# Patient Record
Sex: MALE | Race: WHITE | NOT HISPANIC OR LATINO | URBAN - METROPOLITAN AREA
[De-identification: names, ages, dates, MRNs, and addresses within clinical notes are randomized per-mention and may not be internally consistent; named-entity substitution may affect disease eponyms.]

---

## 2017-02-09 ENCOUNTER — COMMUNICATION - HEALTHEAST (OUTPATIENT)
Dept: FAMILY MEDICINE | Facility: CLINIC | Age: 37
End: 2017-02-09

## 2017-02-09 ENCOUNTER — OFFICE VISIT - HEALTHEAST (OUTPATIENT)
Dept: FAMILY MEDICINE | Facility: CLINIC | Age: 37
End: 2017-02-09

## 2017-02-09 DIAGNOSIS — F32.A ANXIETY AND DEPRESSION: ICD-10-CM

## 2017-02-09 DIAGNOSIS — M79.643 HAND PAIN: ICD-10-CM

## 2017-02-09 DIAGNOSIS — F41.9 ANXIETY AND DEPRESSION: ICD-10-CM

## 2017-02-09 DIAGNOSIS — K21.9 GERD (GASTROESOPHAGEAL REFLUX DISEASE): ICD-10-CM

## 2017-02-09 DIAGNOSIS — R61 NIGHT SWEATS: ICD-10-CM

## 2017-02-09 LAB — HBA1C MFR BLD: 9.4 % (ref 3.5–6)

## 2017-02-09 ASSESSMENT — MIFFLIN-ST. JEOR: SCORE: 1517.48

## 2017-02-10 LAB — ANA SER QL: 0.3 U

## 2017-02-22 ENCOUNTER — OFFICE VISIT - HEALTHEAST (OUTPATIENT)
Dept: EDUCATION SERVICES | Facility: CLINIC | Age: 37
End: 2017-02-22

## 2017-04-18 ENCOUNTER — COMMUNICATION - HEALTHEAST (OUTPATIENT)
Dept: FAMILY MEDICINE | Facility: CLINIC | Age: 37
End: 2017-04-18

## 2017-04-18 ENCOUNTER — RECORDS - HEALTHEAST (OUTPATIENT)
Dept: ADMINISTRATIVE | Facility: OTHER | Age: 37
End: 2017-04-18

## 2017-04-19 ENCOUNTER — COMMUNICATION - HEALTHEAST (OUTPATIENT)
Dept: FAMILY MEDICINE | Facility: CLINIC | Age: 37
End: 2017-04-19

## 2017-04-19 ENCOUNTER — OFFICE VISIT - HEALTHEAST (OUTPATIENT)
Dept: FAMILY MEDICINE | Facility: CLINIC | Age: 37
End: 2017-04-19

## 2017-04-19 DIAGNOSIS — F32.A ANXIETY AND DEPRESSION: ICD-10-CM

## 2017-04-19 DIAGNOSIS — F41.9 ANXIETY AND DEPRESSION: ICD-10-CM

## 2017-04-19 LAB — HBA1C MFR BLD: 9.2 % (ref 3.5–6)

## 2017-04-20 ENCOUNTER — RECORDS - HEALTHEAST (OUTPATIENT)
Dept: ADMINISTRATIVE | Facility: OTHER | Age: 37
End: 2017-04-20

## 2017-04-21 ENCOUNTER — RECORDS - HEALTHEAST (OUTPATIENT)
Dept: ADMINISTRATIVE | Facility: OTHER | Age: 37
End: 2017-04-21

## 2017-04-27 ENCOUNTER — OFFICE VISIT - HEALTHEAST (OUTPATIENT)
Dept: FAMILY MEDICINE | Facility: CLINIC | Age: 37
End: 2017-04-27

## 2017-04-27 DIAGNOSIS — F32.A ANXIETY AND DEPRESSION: ICD-10-CM

## 2017-04-27 DIAGNOSIS — K21.9 GASTROESOPHAGEAL REFLUX DISEASE WITHOUT ESOPHAGITIS: ICD-10-CM

## 2017-04-27 DIAGNOSIS — F41.9 ANXIETY AND DEPRESSION: ICD-10-CM

## 2017-05-31 ENCOUNTER — OFFICE VISIT - HEALTHEAST (OUTPATIENT)
Dept: FAMILY MEDICINE | Facility: CLINIC | Age: 37
End: 2017-05-31

## 2017-05-31 ENCOUNTER — COMMUNICATION - HEALTHEAST (OUTPATIENT)
Dept: SCHEDULING | Facility: CLINIC | Age: 37
End: 2017-05-31

## 2017-05-31 DIAGNOSIS — R61 NIGHT SWEATS: ICD-10-CM

## 2017-05-31 DIAGNOSIS — R10.9 ABDOMINAL PAIN: ICD-10-CM

## 2017-06-01 ENCOUNTER — COMMUNICATION - HEALTHEAST (OUTPATIENT)
Dept: FAMILY MEDICINE | Facility: CLINIC | Age: 37
End: 2017-06-01

## 2017-06-01 DIAGNOSIS — F41.9 ANXIETY AND DEPRESSION: ICD-10-CM

## 2017-06-01 DIAGNOSIS — F32.A ANXIETY AND DEPRESSION: ICD-10-CM

## 2017-06-01 LAB
BASOPHILS # BLD AUTO: 0.1 THOU/UL (ref 0–0.2)
BASOPHILS NFR BLD AUTO: 1 % (ref 0–2)
EOSINOPHIL # BLD AUTO: 0.1 THOU/UL (ref 0–0.4)
EOSINOPHIL NFR BLD AUTO: 1 % (ref 0–6)
ERYTHROCYTE [DISTWIDTH] IN BLOOD BY AUTOMATED COUNT: 13.6 % (ref 11–14.5)
HCT VFR BLD AUTO: 43.6 % (ref 40–54)
HGB BLD-MCNC: 14.6 G/DL (ref 14–18)
LAB AP CHARGES (HE HISTORICAL CONVERSION): NORMAL
LYMPHOCYTES # BLD AUTO: 2.7 THOU/UL (ref 0.8–4.4)
LYMPHOCYTES NFR BLD AUTO: 34 % (ref 20–40)
MCH RBC QN AUTO: 31.1 PG (ref 27–34)
MCHC RBC AUTO-ENTMCNC: 33.5 G/DL (ref 32–36)
MCV RBC AUTO: 93 FL (ref 80–100)
MONOCYTES # BLD AUTO: 0.4 THOU/UL (ref 0–0.9)
MONOCYTES NFR BLD AUTO: 5 % (ref 2–10)
NEUTROPHILS # BLD AUTO: 4.7 THOU/UL (ref 2–7.7)
NEUTROPHILS NFR BLD AUTO: 59 % (ref 50–70)
PATH REPORT.COMMENTS IMP SPEC: NORMAL
PATH REPORT.COMMENTS IMP SPEC: NORMAL
PATH REPORT.FINAL DX SPEC: NORMAL
PATH REPORT.MICROSCOPIC SPEC OTHER STN: ABNORMAL
PATH REPORT.MICROSCOPIC SPEC OTHER STN: NORMAL
PATH REPORT.RELEVANT HX SPEC: NORMAL
PLATELET # BLD AUTO: 198 THOU/UL (ref 140–440)
PMV BLD AUTO: 12.2 FL (ref 8.5–12.5)
RBC # BLD AUTO: 4.7 MILL/UL (ref 4.4–6.2)
WBC: 8 THOU/UL (ref 4–11)

## 2017-06-02 ENCOUNTER — HOSPITAL ENCOUNTER (OUTPATIENT)
Dept: CT IMAGING | Facility: HOSPITAL | Age: 37
Discharge: HOME | End: 2017-06-02
Attending: NURSE PRACTITIONER

## 2017-06-02 ENCOUNTER — COMMUNICATION - HEALTHEAST (OUTPATIENT)
Dept: TELEHEALTH | Facility: CLINIC | Age: 37
End: 2017-06-02

## 2017-06-02 ENCOUNTER — COMMUNICATION - HEALTHEAST (OUTPATIENT)
Dept: FAMILY MEDICINE | Facility: CLINIC | Age: 37
End: 2017-06-02

## 2017-06-02 DIAGNOSIS — R10.9 ABDOMINAL PAIN: ICD-10-CM

## 2017-06-02 DIAGNOSIS — Z76.89 SLEEP CONCERN: ICD-10-CM

## 2017-06-05 ENCOUNTER — RECORDS - HEALTHEAST (OUTPATIENT)
Dept: ADMINISTRATIVE | Facility: OTHER | Age: 37
End: 2017-06-05

## 2017-06-19 ENCOUNTER — COMMUNICATION - HEALTHEAST (OUTPATIENT)
Dept: FAMILY MEDICINE | Facility: CLINIC | Age: 37
End: 2017-06-19

## 2017-06-19 DIAGNOSIS — F32.A ANXIETY AND DEPRESSION: ICD-10-CM

## 2017-06-19 DIAGNOSIS — F41.9 ANXIETY AND DEPRESSION: ICD-10-CM

## 2017-06-29 ENCOUNTER — OFFICE VISIT - HEALTHEAST (OUTPATIENT)
Dept: FAMILY MEDICINE | Facility: CLINIC | Age: 37
End: 2017-06-29

## 2017-06-29 DIAGNOSIS — K21.9 GASTROESOPHAGEAL REFLUX DISEASE WITHOUT ESOPHAGITIS: ICD-10-CM

## 2017-06-29 DIAGNOSIS — Z01.810 PREOPERATIVE CARDIOVASCULAR EXAMINATION: ICD-10-CM

## 2017-06-29 ASSESSMENT — MIFFLIN-ST. JEOR: SCORE: 1530.24

## 2017-07-04 ENCOUNTER — RECORDS - HEALTHEAST (OUTPATIENT)
Dept: ADMINISTRATIVE | Facility: OTHER | Age: 37
End: 2017-07-04

## 2017-07-06 ENCOUNTER — COMMUNICATION - HEALTHEAST (OUTPATIENT)
Dept: FAMILY MEDICINE | Facility: CLINIC | Age: 37
End: 2017-07-06

## 2017-07-10 ENCOUNTER — RECORDS - HEALTHEAST (OUTPATIENT)
Dept: ADMINISTRATIVE | Facility: OTHER | Age: 37
End: 2017-07-10

## 2017-07-13 ENCOUNTER — AMBULATORY - HEALTHEAST (OUTPATIENT)
Dept: ENDOCRINOLOGY | Facility: CLINIC | Age: 37
End: 2017-07-13

## 2017-07-13 ENCOUNTER — OFFICE VISIT - HEALTHEAST (OUTPATIENT)
Dept: ENDOCRINOLOGY | Facility: CLINIC | Age: 37
End: 2017-07-13

## 2017-07-13 ASSESSMENT — MIFFLIN-ST. JEOR: SCORE: 1485.5

## 2017-07-19 ENCOUNTER — COMMUNICATION - HEALTHEAST (OUTPATIENT)
Dept: FAMILY MEDICINE | Facility: CLINIC | Age: 37
End: 2017-07-19

## 2017-07-19 DIAGNOSIS — F32.A ANXIETY AND DEPRESSION: ICD-10-CM

## 2017-07-19 DIAGNOSIS — F41.9 ANXIETY AND DEPRESSION: ICD-10-CM

## 2017-07-20 ENCOUNTER — COMMUNICATION - HEALTHEAST (OUTPATIENT)
Dept: ENDOCRINOLOGY | Facility: CLINIC | Age: 37
End: 2017-07-20

## 2017-07-20 ENCOUNTER — COMMUNICATION - HEALTHEAST (OUTPATIENT)
Dept: FAMILY MEDICINE | Facility: CLINIC | Age: 37
End: 2017-07-20

## 2017-07-21 RX ORDER — LORAZEPAM 0.5 MG/1
TABLET ORAL
Qty: 30 TABLET | Refills: 0 | Status: SHIPPED | OUTPATIENT
Start: 2017-07-21

## 2017-07-25 ENCOUNTER — COMMUNICATION - HEALTHEAST (OUTPATIENT)
Dept: ENDOCRINOLOGY | Facility: CLINIC | Age: 37
End: 2017-07-25

## 2017-07-28 ENCOUNTER — COMMUNICATION - HEALTHEAST (OUTPATIENT)
Dept: FAMILY MEDICINE | Facility: CLINIC | Age: 37
End: 2017-07-28

## 2017-07-28 DIAGNOSIS — F32.A ANXIETY AND DEPRESSION: ICD-10-CM

## 2017-07-28 DIAGNOSIS — F41.9 ANXIETY AND DEPRESSION: ICD-10-CM

## 2017-08-03 ENCOUNTER — COMMUNICATION - HEALTHEAST (OUTPATIENT)
Dept: FAMILY MEDICINE | Facility: CLINIC | Age: 37
End: 2017-08-03

## 2017-08-03 DIAGNOSIS — F41.9 ANXIETY AND DEPRESSION: ICD-10-CM

## 2017-08-03 DIAGNOSIS — F32.A ANXIETY AND DEPRESSION: ICD-10-CM

## 2017-08-24 ENCOUNTER — COMMUNICATION - HEALTHEAST (OUTPATIENT)
Dept: FAMILY MEDICINE | Facility: CLINIC | Age: 37
End: 2017-08-24

## 2017-08-26 ENCOUNTER — RECORDS - HEALTHEAST (OUTPATIENT)
Dept: ADMINISTRATIVE | Facility: OTHER | Age: 37
End: 2017-08-26

## 2017-09-06 ENCOUNTER — COMMUNICATION - HEALTHEAST (OUTPATIENT)
Dept: FAMILY MEDICINE | Facility: CLINIC | Age: 37
End: 2017-09-06

## 2017-09-06 DIAGNOSIS — K21.9 GERD (GASTROESOPHAGEAL REFLUX DISEASE): ICD-10-CM

## 2017-09-07 ENCOUNTER — COMMUNICATION - HEALTHEAST (OUTPATIENT)
Dept: FAMILY MEDICINE | Facility: CLINIC | Age: 37
End: 2017-09-07

## 2017-09-07 RX ORDER — LISINOPRIL 2.5 MG/1
TABLET ORAL
Qty: 90 TABLET | Refills: 2 | Status: SHIPPED | OUTPATIENT
Start: 2017-09-07

## 2017-09-11 ENCOUNTER — COMMUNICATION - HEALTHEAST (OUTPATIENT)
Dept: FAMILY MEDICINE | Facility: CLINIC | Age: 37
End: 2017-09-11

## 2017-10-10 ENCOUNTER — COMMUNICATION - HEALTHEAST (OUTPATIENT)
Dept: FAMILY MEDICINE | Facility: CLINIC | Age: 37
End: 2017-10-10

## 2017-10-16 ENCOUNTER — COMMUNICATION - HEALTHEAST (OUTPATIENT)
Dept: FAMILY MEDICINE | Facility: CLINIC | Age: 37
End: 2017-10-16

## 2017-11-08 ENCOUNTER — COMMUNICATION - HEALTHEAST (OUTPATIENT)
Dept: FAMILY MEDICINE | Facility: CLINIC | Age: 37
End: 2017-11-08

## 2017-12-25 ENCOUNTER — COMMUNICATION - HEALTHEAST (OUTPATIENT)
Dept: FAMILY MEDICINE | Facility: CLINIC | Age: 37
End: 2017-12-25

## 2018-01-19 ENCOUNTER — COMMUNICATION - HEALTHEAST (OUTPATIENT)
Dept: ENDOCRINOLOGY | Facility: CLINIC | Age: 38
End: 2018-01-19

## 2018-01-19 RX ORDER — GLUCOSAMINE HCL/CHONDROITIN SU 500-400 MG
1 CAPSULE ORAL PRN
Qty: 100 STRIP | Refills: 0 | Status: SHIPPED | OUTPATIENT
Start: 2018-01-19

## 2018-05-15 ENCOUNTER — COMMUNICATION - HEALTHEAST (OUTPATIENT)
Dept: FAMILY MEDICINE | Facility: CLINIC | Age: 38
End: 2018-05-15

## 2018-06-22 ENCOUNTER — COMMUNICATION - HEALTHEAST (OUTPATIENT)
Dept: FAMILY MEDICINE | Facility: CLINIC | Age: 38
End: 2018-06-22

## 2018-08-06 ENCOUNTER — RECORDS - HEALTHEAST (OUTPATIENT)
Dept: ADMINISTRATIVE | Facility: OTHER | Age: 38
End: 2018-08-06

## 2019-02-27 ENCOUNTER — COMMUNICATION - HEALTHEAST (OUTPATIENT)
Dept: FAMILY MEDICINE | Facility: CLINIC | Age: 39
End: 2019-02-27

## 2019-04-08 ENCOUNTER — COMMUNICATION - HEALTHEAST (OUTPATIENT)
Dept: ENDOCRINOLOGY | Facility: CLINIC | Age: 39
End: 2019-04-08

## 2019-04-17 ENCOUNTER — COMMUNICATION - HEALTHEAST (OUTPATIENT)
Dept: FAMILY MEDICINE | Facility: CLINIC | Age: 39
End: 2019-04-17

## 2019-04-24 ENCOUNTER — COMMUNICATION - HEALTHEAST (OUTPATIENT)
Dept: ENDOCRINOLOGY | Facility: CLINIC | Age: 39
End: 2019-04-24

## 2021-04-06 ENCOUNTER — AMBULATORY - HEALTHEAST (OUTPATIENT)
Dept: NURSING | Facility: CLINIC | Age: 41
End: 2021-04-06

## 2021-04-27 ENCOUNTER — AMBULATORY - HEALTHEAST (OUTPATIENT)
Dept: NURSING | Facility: CLINIC | Age: 41
End: 2021-04-27

## 2021-05-27 ENCOUNTER — RECORDS - HEALTHEAST (OUTPATIENT)
Dept: ADMINISTRATIVE | Facility: CLINIC | Age: 41
End: 2021-05-27

## 2021-05-30 VITALS — WEIGHT: 153.19 LBS | BODY MASS INDEX: 25.49 KG/M2

## 2021-05-30 VITALS — WEIGHT: 152.25 LBS | HEIGHT: 65 IN | BODY MASS INDEX: 25.37 KG/M2

## 2021-05-30 VITALS — WEIGHT: 146.25 LBS | BODY MASS INDEX: 24.34 KG/M2

## 2021-05-31 ENCOUNTER — RECORDS - HEALTHEAST (OUTPATIENT)
Dept: ADMINISTRATIVE | Facility: CLINIC | Age: 41
End: 2021-05-31

## 2021-05-31 VITALS — BODY MASS INDEX: 25.83 KG/M2 | WEIGHT: 155.06 LBS | HEIGHT: 65 IN

## 2021-05-31 VITALS — HEIGHT: 65 IN | BODY MASS INDEX: 24.19 KG/M2 | WEIGHT: 145.2 LBS

## 2021-05-31 VITALS — WEIGHT: 157.13 LBS | BODY MASS INDEX: 26.15 KG/M2

## 2021-06-08 NOTE — PROGRESS NOTES
Assessment & Plan:  1. Uncontrolled type 1 diabetes mellitus with complication  A1C is 9.5 today, we will continue to monitor closely every 3 months and get patient referred to endocrine as well as having him eat more urgently with diabetic educator.  - LANTUS SOLOSTAR 100 unit/mL (3 mL) pen; Take 25 Units by mouth bedtime.  Dispense: 5 adj dose pen; Refill: 1  - lisinopril (PRINIVIL,ZESTRIL) 2.5 MG tablet; Take 1 tablet (2.5 mg total) by mouth daily.  Dispense: 90 tablet; Refill: 1  - NOVOLOG FLEXPEN 100 unit/mL injection pen; Inject 5 Units under the skin as needed.  Dispense: 5 Pre-filled Pen Syringe; Refill: 2  - Ambulatory referral to Diabetes Education (Existing Diagnosis)  - Glycosylated Hemoglobin A1c  - Ambulatory referral to Endocrinology  - Comprehensive Metabolic Panel  - Microalbumin, Random Urine    2. Hand pain  Regarding bilateral hand pain we will check labs to rule out rheumatoid arthritis.  Other differentials include but are not limited to osteoarthritis, carpal tunnel, neuropathy.  If labs are normal referral for patient to orthopedics for further evaluation and management.  - Ambulatory referral to Orthopedics  - Rheumatoid Factor Quant  - Antinuclear Antibody (DARRIAN) Cascade  - C-Reactive Protein(CRP)    3. GERD (gastroesophageal reflux disease)  Seems fairly well-controlled on his current regimen.  Continue for now.  Patient does note that since his cholecystectomy he has noticed increase in nausea fairly frequently during the week.  Will discuss this in more detail and he follows up for his med check for sertraline in 1 month.  - omeprazole (PRILOSEC) 20 MG capsule; Take 1 capsule (20 mg total) by mouth daily.  Dispense: 90 capsule; Refill: 1  - ranitidine (ZANTAC) 150 MG tablet; Take 2 tablets (300 mg total) by mouth bedtime.  Dispense: 90 tablet; Refill: 1    4. Anxiety and depression  Patient desires to switch medications, will switch to sertraline as he has previously been well controlled  on Paxil for mostly situational and social anxiety.  Discussed that it is appropriate to switch directly from current dose to new dose of sertraline preop.  Discussed side effects in detail.  We will start at 50 mg daily and have him come back for medication check in 1 month.  Let us know if side effects are bothersome prior to visit.  - sertraline (ZOLOFT) 50 MG tablet; Take 1 tablet (50 mg total) by mouth daily.  Dispense: 30 tablet; Refill: 2    5. Night sweats  Check lab work today, suspect that sweats may have to do with former drug use or possibly lack of control for diabetes.  As long as labs look okay we can continue to monitor symptoms.  She has had a recent TB test approximately 1 year ago and no previous history of IV drug use or risk for HIV.  - HM1(CBC and Differential)  - Thyroid [place]      There are no Patient Instructions on file for this visit.    Orders Placed This Encounter   Procedures     Glycosylated Hemoglobin A1c     Comprehensive Metabolic Panel     Thyroid Cascade     HM1 (CBC with Diff)     Microalbumin, Random Urine     Rheumatoid Factor Quant     Antinuclear Antibody (DARRIAN) Cascade     C-Reactive Protein(CRP)     Ambulatory referral to Orthopedics     Referral Priority:   Routine     Referral Type:   Consultation     Referral Reason:   Evaluation and Treatment     Requested Specialty:   Orthopedics     Number of Visits Requested:   1     Ambulatory referral to Diabetes Education (Existing Diagnosis)     Referral Priority:   Routine     Referral Type:   Consultation     Referral Reason:   Evaluation and Treatment     Number of Visits Requested:   1     Ambulatory referral to Endocrinology     Referral Priority:   Routine     Referral Type:   Consultation     Referral Reason:   Evaluation and Treatment     Requested Specialty:   Endocrinology     Number of Visits Requested:   1     Medications Discontinued During This Encounter   Medication Reason     LANTUS SOLOSTAR 100 unit/mL (3 mL)  pen Reorder     lisinopril (PRINIVIL,ZESTRIL) 2.5 MG tablet Reorder     NOVOLOG FLEXPEN 100 unit/mL injection pen Reorder     omeprazole (PRILOSEC) 20 MG capsule Reorder     ranitidine (ZANTAC) 150 MG tablet Reorder         I have counseled the patient for tobacco cessation and the follow up will occur  at the next visit.    Chief Complaint:   Chief Complaint   Patient presents with     Medication Management     Medication Refill     Establish Care       History of Present Illness:  Boris is a 36 y.o. male presenting to the clinic today to establish care and discuss several concerns.  Patient is a type I diabetic who is on insulin daily for control.  He has not been followed up on in about a year for his A1c or diabetic medications.  He is here requesting lab work and A1c today.  He would also like to establish care with a new endocrinologist. He believes his last A1c was in the high sevens.  Today it came back at 9.4.  He is also not been in to see a diabetic educator in several years, we will place that referral for him as well today.  Related to diabetes he believes he may have some neuropathy in the hands as he has been having problems with his hands for several years.  He notes that he has pain in both thumbs and they lock up at times on a regular basis over the last couple of years.  Overall hand pain has been present for 4 years.  Intermittently he will have swelling in the joints of the hands on and off when he uses them a lot at work.  He also get numbness and tingling and burning pain in the left forearm.  He is not sure of any family history of rheumatoid arthritis.  He currently smokes half a pack per day, he does not use alcohol as he quit several years ago.  He does have a past history of illicit drug use including methamphetamines and marijuana.  He is sober from meth for 1 year.  He does also complain of night sweats for approximately 5 years on and off.  He is not sure what the cause of these are but  "they do wake him up at night.  He does not feel it is related to blood sugar because even on he has been low he has never had that severe of the symptom.  Does not feel palpitations or chest pain.  No recent illnesses.  He also takes Paxil currently for anxiety and depression.  He has been stable on this dose of medication for many years.  He does feel he has been getting more GI side effects with that in the past year or so.  He notes this occurred after his gallbladder removal 2 years ago.  He is interested in trying a different medication, as he does not feel that Paxil is controlling his mood very well and he has noticed increasing and side effects.    All other systems are negative except as noted above.    [facility]:  reviewed and updated.    Tobacco Use:  History   Smoking Status     Current Every Day Smoker   Smokeless Tobacco     Not on file       Vitals:  Vitals:    02/09/17 0807   BP: 114/80   Patient Site: Left Arm   Patient Position: Sitting   Cuff Size: Adult Regular   Pulse: 78   Resp: 16   Temp: 97.9  F (36.6  C)   TempSrc: Oral   Weight: 152 lb 4 oz (69.1 kg)   Height: 5' 5\" (1.651 m)     Wt Readings from Last 3 Encounters:   02/09/17 152 lb 4 oz (69.1 kg)   09/19/16 155 lb (70.3 kg)   09/03/16 155 lb (70.3 kg)       Physical Exam:  Constitutional:  Reveals an alert, cooperative, 35 yo male in no acute distress.  Vitals:  Per nursing notes.  Eyes: PERRLA, Fundoscopic exam WNL  Ears:  TMs clear bilaterally  Oropharynx:  Without erythema, posterior nasal drainage or thrush.  Neck:  Supple, no lymphadenopathy,  thyroid not palpable.  Cardiac:  Regular rate and rhythm without murmurs, rubs, or gallops. Carotids without bruits. Legs without edema.   Lungs: Clear.  Respiratory effort normal.  Abdomen:  non-tender, non-distended.  Neither liver nor spleen palpable.  Skin:   Without rash, bruise, or palpable lesions.  Extremities: CMS intact, distal pulses intact. No inguinal or axillary " lymphadenopathy.  Rheumatologic: Normal joints and nails of the hands.  Neurologic:  No gross focal deficits.    Psychiatric:  Mood appropriate, memory intact.     Data Reviewed:  Additional History from Old Records or Another Person Summarized (2 total): None.     Decision to Obtain Extra information (1 total): None.     Radiology Tests Summarized and Ordered (XRAY/CT/MRI/DXA) (1 total): None.    Labs Reviewed and Ordered (1 total): CMP, heme 1, A1c, DARRIAN, rheumatoid factor, CRP, thyroid cascade    Medicine Tests Summarized and Ordered (EKG/ECHO/COLONOSCOPY/EGD) (1 total): None.    Independent Review of EKG or X-Ray (2 each): None.    The visit lasted a total of 45 minutes face to face with the patient. Over 50% of the time was spent counseling and educating the patient about plan of care.    Medications:  Current Outpatient Prescriptions   Medication Sig Dispense Refill     INSULIN ASPART (NOVOLOG SUBQ) Inject under the skin.       INSULIN GLARGINE,HUM.REC.ANLOG (LANTUS SUBQ) Inject under the skin.       LANTUS SOLOSTAR 100 unit/mL (3 mL) pen Take 25 Units by mouth bedtime. 5 adj dose pen 1     lisinopril (PRINIVIL,ZESTRIL) 2.5 MG tablet Take 1 tablet (2.5 mg total) by mouth daily. 90 tablet 1     NOVOLOG FLEXPEN 100 unit/mL injection pen Inject 5 Units under the skin as needed. 5 Pre-filled Pen Syringe 2     omeprazole (PRILOSEC) 20 MG capsule Take 1 capsule (20 mg total) by mouth daily. 90 capsule 1     OMEPRAZOLE ORAL Take by mouth.       PARoxetine (PAXIL) 20 MG tablet Take 20 mg by mouth daily.  0     PAROXETINE HCL (PAXIL ORAL) Take by mouth.       ranitidine (ZANTAC) 150 MG tablet Take 2 tablets (300 mg total) by mouth bedtime. 90 tablet 1     RANITIDINE HCL ORAL Take by mouth.       sertraline (ZOLOFT) 50 MG tablet Take 1 tablet (50 mg total) by mouth daily. 30 tablet 2     No current facility-administered medications for this visit.        Total Data Points: 1    YASHIRA Abreu, CNP    This note has been  dictated using voice recognition software. Any grammatical or context distortions are unintentional and inherent to the software

## 2021-06-09 NOTE — PROGRESS NOTES
"Assessment: pt seen today for DM education. He was diagnosed at 10 years old. He admits he was not really even checking his BG for a long time. He was given a new meter at his visit with Marilee Griffin CNP on 2/9 and has used it a couple times. He did not bring meter in today. He recalls last night his BG was 97. 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 He is very active at work. On the weekends he tries to stay active as well, he likes to play [employer] golf.     Pt typically does not eat breakfast, he will have a coffee with sugar or diet soda in the morning. Lunch may be a sandwich and chips and dinner may be a steak or burger. He does not snack much, but he does like sweets. Pt states \"I just eat whatever, and whatever I want.\" We reviewed CHO foods today and portions. Discussed the importance of trying to eat breakfast. Pt feels like he wants better control over his DM. He seems motivated.     Plan: start checking BG at least 3-4x/day, rotating before and 2 hours after meals. Log in log book. Start paying closer attention to CHO portions, goal 3-4/meal. Eat something for breakfast.   Once we have BG data we can start adjusting insulin to have better control of your numbers. If we do not get good data in 2 weeks at our f/u. We can consider doing a 5 day ipro CGM. Pt is willing to do this.     Subjective and Objective:      Boris Pierson is referred by Marilee Griffin CNP for Diabetes Education.     Lab Results   Component Value Date    HGBA1C 9.4 (H) 02/09/2017         Current diabetes medications:  Lantus 25 units/day. Novolog 5 units/meals. May take 6 units if it is a big meal.       Follow up:   2 weeks, 3/8 as scheduled       Education:     Monitoring   Meter (per above goals): Discussed  Monitoring: Assessed and Discussed  BG goals: Discussed    Nutrition Management  Nutrition Management: " Assessed, Discussed and Literature provided  Weight: Not addressed  Portions/Balance: Assessed, Discussed and Literature provided  Carb ID/Count: Assessed, Discussed and Literature provided  Label Reading: Not addressed  Heart Healthy Fats: Literature provided  Menu Planning: Assessed and Discussed  Dining Out: Assessed and Discussed  Physical Activity: Assessed and Discussed  Injected Medications: Discussed   Storage/Exp:Not addressed   Site Rotation: Not addressed   Sites Assessed: no    Diabetes Disease Process: Discussed    Acute Complications: Prevent, Detect, Treat:  Hypoglycemia: Assessed and Discussed  Hyperglycemia: Assessed and Discussed  Sick Days: Not addressed  Driving: Not addressed    Chronic Complications  Foot Care:Discussed  Skin Care: Not addressed  Eye: Discussed and he is due for eye exam  ABC: Discussed  Teeth:Not addressed  Goal Setting and Problem Solving: Assessed and Discussed  Barriers: Assessed and Discussed  Psychosocial Adjustments: Assessed and Discussed      Time spent with the patient: 30 minutes for diabetes education and counseling.   Previous Education: no  Visit Type:DSMT  Hours Remaining: DSMT 1.5 and MNT 2      Pari Oviedo  2/22/2017

## 2021-06-10 NOTE — PROGRESS NOTES
Assessment & Plan:  1. Anxiety and depression  Boris started on Paxil during his hospitalization.  It is only been about a week and so he has not found it to be helpful yet.  They did also give him as needed lorazepam for panic attack or anxiety.  Thus far he is not use this as he knows he should use it sparingly. He would like to increase the dose of Paxil as he has tolerated it well so far. He will increase to 30 mg daily for at least one week, and we discussed then moving to 40 mg daily if he desires. Continue current dose of medication and follow-up with me in 1 month for medication check.  At that time we can make further dose adjustments as necessary if anxiety and depression is not under better control.  - LORazepam (ATIVAN) 0.5 MG tablet; TAKE 1 TABLET PO TID PRN FOR ANXIETY.; Refill: 0    2. Uncontrolled type 1 diabetes mellitus with complication  Encouraged him to set up appointment with Endocrinology. He plans to call today.     3. Gastroesophageal reflux disease without esophagitis  GERD symptoms a bit worse since starting Paxil. Discussed taking with food, and that most GI side effects if related to medication should improve over the next few weeks.       There are no Patient Instructions on file for this visit.    No orders of the defined types were placed in this encounter.    There are no discontinued medications.        I have counseled the patient for tobacco cessation and the follow up will occur  at the next visit.  The following are part of a depression follow up plan for the patient: management of mental health treatment and patient follow-up to return when and if necessary     Chief Complaint:   Chief Complaint   Patient presents with     Follow-up     [facility] 4/20-4/21        History of Present Illness:  Boris is a 36 y.o. male presenting to the clinic today for follow up after ED visit and overnight admission to [facility] for Anxiety.On the evening of 4/20/2017 he began  experiencing some anxiety and pain in his chest and tingling sensation down both of his legs.  At that point he presented to the emergency room for evaluation.  Due to the chest pain they sent him to the emergency department at [facility].  Once there he was given lorazepam and evaluated for cardiac concerns.  He was admitted overnight for observation and they also performed a stress test which was normal.  He does have a history of methamphetamine abuse and states that he did smoke methamphetamine 5 days prior to this episode occurring.  He is also been having increased anxiety prior to what he considers a panic attack that occurred leading him to this admission.  After observation and monitoring overnight in the hospital he was discharged home he was started on Paxil 20 mg daily and given a prescription for lorazepam 0.5 mg as needed for anxiety.    He has also noticed since starting Paxil that his GERD is a bit worse. Still using PPI daily. He has not noticed much effect yet from the Paxil, but only started it 6 days ago. Diabetes has been the same as far as blood sugars. He plans to call endocrine for an appointment today.      Review of Systems:  All other systems are negative except as noted above.    [facility]:  Reviewed and updated. Sober from Meth for 2 weeks. Plans to continue with sobriety. He only used meth occasionally in the past. He does use regular marijuana. He is not interested in chemical dependency treatment. He states he has gotten in touch with his sober friends and has people he can call if he has cravings.    Tobacco Use:  History   Smoking Status     Current Every Day Smoker   Smokeless Tobacco     Not on file       Vitals:  Vitals:    04/27/17 0805   BP: 132/76   Patient Site: Left Arm   Patient Position: Sitting   Cuff Size: Adult Regular   Pulse: 74   Resp: 16   Weight: 153 lb 3 oz (69.5 kg)     Wt Readings from Last 3 Encounters:   04/27/17 153 lb 3 oz (69.5 kg)   04/19/17 146 lb 4 oz  (66.3 kg)   02/09/17 152 lb 4 oz (69.1 kg)       Physical Exam:  Constitutional:  Reveals an alert, cooperative, 36 year old male in no acute distress.  Vitals:  Per nursing notes.  Eyes: PERRLA, funduscopic exam within normal limits.  HENT: TMs clear bilaterally,Oropharynx without erythema, posterior nasal drainage or thrush. Neck supple, no lymphadenopathy,  thyroid not palpable.  Cardiovascular:  Regular rate and rhythm without murmurs, rubs, or gallops. Carotids without bruits. Legs without edema.   Respiratory: Clear.  Respiratory effort normal.  Psychiatric:  Mood appropriate, memory intact.     Data Reviewed:  Additional History from Old Records or Another Person Summarized (2 total): None.     Decision to Obtain Extra information (1 total): None.     Radiology Tests Summarized and Ordered (XRAY/CT/MRI/DXA) (1 total): None.    Labs Reviewed and Ordered (1 total): None.    Medicine Tests Summarized and Ordered (EKG/ECHO/COLONOSCOPY/EGD) (1 total): None.    Independent Review of EKG or X-Ray (2 each): None.    The visit lasted a total of 25 minutes face to face with the patient. Over 50% of the time was spent counseling and educating the patient about plan of care.    Medications:  Current Outpatient Prescriptions   Medication Sig Dispense Refill     INSULIN ASPART (NOVOLOG SUBQ) Inject under the skin.       insulin glargine (LANTUS; BASAGLAR) 100 unit/mL (3 mL) pen Inject 30 Units under the skin at bedtime. 5 adj dose pen 0     lisinopril (PRINIVIL,ZESTRIL) 2.5 MG tablet Take 1 tablet (2.5 mg total) by mouth daily. 90 tablet 1     LORazepam (ATIVAN) 0.5 MG tablet TAKE 1 TABLET PO TID PRN FOR ANXIETY.  0     NOVOLOG FLEXPEN 100 unit/mL injection pen Inject 5 Units under the skin as needed. 5 Pre-filled Pen Syringe 2     omeprazole (PRILOSEC) 20 MG capsule Take 1 capsule (20 mg total) by mouth daily. 90 capsule 1     OMEPRAZOLE ORAL Take by mouth.       PARoxetine (PAXIL) 20 MG tablet Take 20 mg by mouth daily.   0     ranitidine (ZANTAC) 150 MG tablet Take 2 tablets (300 mg total) by mouth bedtime. 90 tablet 1     RANITIDINE HCL ORAL Take by mouth.       sertraline (ZOLOFT) 50 MG tablet TAKE 1 TABLET(50 MG) BY MOUTH DAILY 30 tablet 2     No current facility-administered medications for this visit.        Total Data Points: 0    YASHIRA Abreu, CNP    This note has been dictated using voice recognition software. Any grammatical or context distortions are unintentional and inherent to the software

## 2021-06-10 NOTE — PROGRESS NOTES
Assessment & Plan:  1. Uncontrolled type 1 diabetes mellitus with complication  A1c today is 9.2, slightly improved from previous but not at goal.  Patient referred again to endocrinology so that we can help him set up an appointment as soon as we can.  Suspect he may need an insulin pump or more consistent dosing given his age as well.  So far seemingly no complications but we need to get him under control as quickly as possible.  We will help him set up endocrine appointment and I also encouraged him to set up an appointment again with diabetic education.  Depending on future visits I will have him see me again in 3 months for further testing and lab work.  Today we refilled basal car and discussed that this should be effective we just may need to dose adjust.  I recommended he use 30 units nightly and not titrate dosing based on sugars especially if he is not having any hypoglycemic episodes.  Hopefully this will improve things slightly until we can get him into see endocrine.  - Glycosylated Hemoglobin A1c  - Ambulatory referral to Endocrinology      There are no Patient Instructions on file for this visit.    Orders Placed This Encounter   Procedures     Glycosylated Hemoglobin A1c     Ambulatory referral to Endocrinology     Referral Priority:   Routine     Referral Type:   Consultation     Referral Reason:   Evaluation and Treatment     Requested Specialty:   Endocrinology     Number of Visits Requested:   1     Medications Discontinued During This Encounter   Medication Reason     PAROXETINE HCL (PAXIL ORAL) Dose adjustment     INSULIN GLARGINE,HUM.REC.ANLOG (LANTUS SUBQ)      LANTUS SOLOSTAR 100 unit/mL (3 mL) pen            Chief Complaint:   Chief Complaint   Patient presents with     Medication Management       History of Present Illness:  Boris is a 36 y.o. male presenting to the clinic today for medication check and follow-up.  He wanted to discuss his diabetic medications as over the past month his  Lantus stopped being covered by insurance and he was given a substitution called Basaglar pen.  He does not feel this has been working as well to control his sugars but is not sure.  He cannot afford to pay for the Lantus pen.  Regimen.  With his monitor he has noticed sugars have been about 190 on average during the day and average about 300 in the mornings fasting.  While he feels things have improved since going back to see a diabetic educator.  He did not attend his endocrinology appointment because he went on the wrong day.  He needs to get set up again with an appointment there.  He also missed his last appointment with diabetic education.  Previously we saw him for some hand pain and ruled out arthritic causes due to rheumatoid arthritis.  Pain is since resolved and he would like some paperwork filled out so he can return to work.. Patient uses 5 units of insulin aspart with meals +1 unit per carb count.  Nightly he takes between 20-30 units of the vasa Alysha depending on his blood sugar.  He is not having any low blood sugars.    Review of Systems:  All other systems are negative except as noted above.    PFS:  Reviewed and updated.    Tobacco Use:  History   Smoking Status     Current Every Day Smoker   Smokeless Tobacco     Not on file       Vitals:  Vitals:    04/19/17 0803   BP: 122/76   Patient Site: Left Arm   Patient Position: Sitting   Cuff Size: Adult Regular   Pulse: 74   Resp: 16   Weight: 146 lb 4 oz (66.3 kg)     Wt Readings from Last 3 Encounters:   04/19/17 146 lb 4 oz (66.3 kg)   02/09/17 152 lb 4 oz (69.1 kg)   09/19/16 155 lb (70.3 kg)       Physical Exam:  Constitutional:  Reveals an alert, cooperative, 36-year-old male in no acute distress.  Vitals:  Per nursing notes.  Cardiovascular:  Regular rate and rhythm without murmurs, rubs, or gallops. Carotids without bruits. Legs without edema.   Respiratory: Clear.  Respiratory effort normal.  Neurologic:  No gross focal deficits.    Psychiatric:  Mood appropriate, memory intact.     Data Reviewed:  Additional History from Old Records or Another Person Summarized (2 total): None.     Decision to Obtain Extra information (1 total): None.     Radiology Tests Summarized and Ordered (XRAY/CT/MRI/DXA) (1 total): None.    Labs Reviewed and Ordered (1 total): A1c    Medicine Tests Summarized and Ordered (EKG/ECHO/COLONOSCOPY/EGD) (1 total): None.    Independent Review of EKG or X-Ray (2 each): None.    The visit lasted a total of 40 minutes face to face with the patient. Over 50% of the time was spent counseling and educating the patient about plan of care.    Medications:  Current Outpatient Prescriptions   Medication Sig Dispense Refill     INSULIN ASPART (NOVOLOG SUBQ) Inject under the skin.       NOVOLOG FLEXPEN 100 unit/mL injection pen Inject 5 Units under the skin as needed. 5 Pre-filled Pen Syringe 2     omeprazole (PRILOSEC) 20 MG capsule Take 1 capsule (20 mg total) by mouth daily. 90 capsule 1     OMEPRAZOLE ORAL Take by mouth.       ranitidine (ZANTAC) 150 MG tablet Take 2 tablets (300 mg total) by mouth bedtime. 90 tablet 1     sertraline (ZOLOFT) 50 MG tablet Take 1 tablet (50 mg total) by mouth daily. 30 tablet 2     insulin glargine (LANTUS; BASAGLAR) 100 unit/mL (3 mL) pen Inject 30 Units under the skin at bedtime. 5 adj dose pen 0     lisinopril (PRINIVIL,ZESTRIL) 2.5 MG tablet Take 1 tablet (2.5 mg total) by mouth daily. 90 tablet 1     PARoxetine (PAXIL) 20 MG tablet Take 20 mg by mouth daily.  0     RANITIDINE HCL ORAL Take by mouth.       No current facility-administered medications for this visit.        Total Data Points: 1    YASHIRA Abreu, CNP    This note has been dictated using voice recognition software. Any grammatical or context distortions are unintentional and inherent to the software

## 2021-06-11 NOTE — PROGRESS NOTES
"[facility]  ENDOCRINOLOGY    Diabetes Note 7/16/2017    Boris Pierson, 1980, [id number]          Reason for visit      1. Uncontrolled type 1 diabetes mellitus with complication        HPI     Boris Pierson is a very pleasant 36 y.o. old male who presents for follow up.  SUMMARY:  Boris is here today in referral from his PCP Dr Griffin. He states that he is mostly sober, in that he is clean of alcohol and drugs, but he is still smoking.  He reports that he spent 5 months in correction.  He tells me that he has been a diabetic since he was 10.  He has brought a meter with him today and over the last month he has had an average BG of 161. He is testing about 10 times a day.  He has had several episodes of hypoglycemia, but he has not needed any assistance from the Paramedics for about 3-4 years.  He is very interested in getting a pump and a CGM, and that is the biggest part of his being here today. His A1c has been slowly dropping and last check was 9.2. He is currently taking Lantus 30 units daily. He takes 5 units of Novolog + SS with meals.  He will do much better with the pump.      Past Medical History     Patient Active Problem List   Diagnosis     Anxiety and depression     Uncontrolled type 1 diabetes mellitus with complication     Hand pain     GERD (gastroesophageal reflux disease)        Family History       family history is not on file.    Social History      reports that he has been smoking.  He does not have any smokeless tobacco history on file. He reports that he drinks alcohol. He reports that he uses illicit drugs, including Methamphetamines.      Review of Systems     Patient has no polyuria or polydipsia, no chest pain, dyspnea or TIA's, no numbness, tingling or pain in extremities  Remainder negative except as noted in HPI.    Vital Signs     /86 (Patient Site: Right Arm, Patient Position: Sitting, Cuff Size: Adult Regular)  Pulse 90  Ht 5' 5\" (1.651 m)  Wt 145 lb 3.2 oz (65.9 kg)  BMI " 24.16 kg/m2  Wt Readings from Last 3 Encounters:   07/13/17 145 lb 3.2 oz (65.9 kg)   06/29/17 155 lb 1 oz (70.3 kg)   05/31/17 157 lb 2 oz (71.3 kg)       Physical Exam     Constitutional:  Well developed, Well nourished  HENT:  Normocephalic,   Neck: Thyroid normal, No lymph nodes, Supple  Eyes:  PERRL, Conjunctiva pink  Respiratory:  Normal breath sounds, No respiratory distress  Cardiovascular:  Normal heart rate, Normal rhythm, No murmurs  GI:  Bowel sounds normal, Soft, No tenderness  Musculoskeletal:  No gross deformity or lesions, normal dorsalis pedis pulses  Skin: No acanthosis nigricans, lipoatrophy or lipodystrophy  Neurologic:  Alert & oriented x 3, nonfocal  Psychiatric:  Affect, Mood, Insight appropriate  Diabetic foot exam: no ulcers, charcot's or high risk calluses, Normal monofilament exam          Assessment     1. Uncontrolled type 1 diabetes mellitus with complication        Plan     I will refer him to DE for pump and CGM support. No current changes to his insulin.  F/u after he gets his pump. Time spent with pt today: 30 min with >50% spent in counseling and coordination of care.       Angela JAMES Endocrinology  7/16/2017  6:52 PM        Lab Results     Hemoglobin A1c   Date Value Ref Range Status   04/19/2017 9.2 (H) 3.5 - 6.0 % Final   02/09/2017 9.4 (H) 3.5 - 6.0 % Final   03/12/2014 9.9 (H) 4.2 - 6.1 % Final   03/12/2014 9.9 (H) 4.2 - 6.1 % Final   02/06/2013 8.0 (H) 4.2 - 6.1 % Final     Creatinine   Date Value Ref Range Status   06/29/2017 0.78 0.70 - 1.30 mg/dL Final   05/31/2017 0.96 0.70 - 1.30 mg/dL Final   02/09/2017 0.81 0.70 - 1.30 mg/dL Final     Microalbumin, Random Urine   Date Value Ref Range Status   02/09/2017 5.61 (H) 0.00 - 1.99 mg/dL Final       Cholesterol   Date Value Ref Range Status   09/19/2016 150 <=199 mg/dL Final     HDL Cholesterol   Date Value Ref Range Status   09/19/2016 54 >=40 mg/dL Final     LDL Calculated   Date Value Ref Range Status   09/19/2016  78 <=129 mg/dL Final     Triglycerides   Date Value Ref Range Status   09/19/2016 89 <=149 mg/dL Final       Lab Results   Component Value Date    ALT 18 06/29/2017    AST 10 06/29/2017    ALKPHOS 69 06/29/2017    BILITOT 0.2 06/29/2017         Current Medications     Outpatient Medications Prior to Visit   Medication Sig Dispense Refill     insulin glargine (LANTUS; BASAGLAR) 100 unit/mL (3 mL) pen Inject 30 Units under the skin at bedtime. 5 adj dose pen 0     lisinopril (PRINIVIL,ZESTRIL) 2.5 MG tablet Take 1 tablet (2.5 mg total) by mouth daily. 90 tablet 1     omeprazole (PRILOSEC) 20 MG capsule Take 1 capsule (20 mg total) by mouth daily. 90 capsule 1     PARoxetine (PAXIL) 20 MG tablet Take 20 mg by mouth daily. Take 1.5 tabs daily  0     ranitidine (ZANTAC) 150 MG capsule Take 2 capsules (300 mg total) by mouth every evening. 180 capsule 1     INSULIN ASPART (NOVOLOG SUBQ) Inject under the skin.       LORazepam (ATIVAN) 0.5 MG tablet TAKE 1 TABLET as needed up to three times a day FOR ANXIETY. 30 tablet 0     NOVOLOG FLEXPEN 100 unit/mL injection pen Inject 5 Units under the skin as needed. 5 Pre-filled Pen Syringe 2     ranitidine (ZANTAC) 150 MG tablet Take 2 tablets (300 mg total) by mouth bedtime. 90 tablet 1     sertraline (ZOLOFT) 50 MG tablet TAKE 1 TABLET(50 MG) BY MOUTH DAILY 30 tablet 2     No facility-administered medications prior to visit.

## 2021-06-11 NOTE — PROGRESS NOTES
Assessment:     Boris Pierson was seen in preoperative consultation in preparation for left carpal tunnel release and trigger thumb release. This is a low risk surgery and the patient has no increased risk for major cardiac complications based on exam and history and appears to be medically stable for the proposed procedure.      36 y.o. male with planned surgery as above.    Known risk factors for perioperative complications: Diabetes mellitus    Difficulty with intubation is not anticipated.    Cardiac Risk Estimation:low risk    Current medications which may produce withdrawal symptoms if withheld perioperatively: none       Plan:      1. Preoperative workup as follows hemoglobin, electrolytes.  2. Change in medication regimen before surgery: discontinue ASA 14 days before surgery.  3. Prophylaxis for cardiac events with perioperative beta-blockers: not indicated.  4. Invasive hemodynamic monitoring perioperatively: not indicated.  5. Deep vein thrombosis prophylaxis postoperatively:regimen to be chosen by surgical team.  6. Surveillance for postoperative MI with ECG immediately postoperatively and on postoperative days 1 and 2 AND troponin levels 24 hours postoperatively and on day 4 or hospital discharge (whichever comes first): not indicated.  7. Other measures: check blood sugar pre-operatively.      Subjective:      Boris Pierson is a 36 y.o. male who presents to the office today for a preoperative consultation at the request of surgeon Dr. Pa who plans on performing  left carpal tunnel release and trigger thumb release on June 30. This consultation is requested for the specific conditions prompting preoperative evaluation (i.e. because of potential affect on operative risk): DM Type 1, Hypertension. Planned anesthesia: general. The patient has the following known anesthesia issues: none. Patients bleeding risk: no recent abnormal bleeding and no remote history of abnormal bleeding.     History of  "present illness: worsening hand pain and numbness overtime, most likely from overuse due to work history. Boris had presented to me for this issue in April, and we referred on to orthopedics for evaluation. He decided to pursue surgery to ideally have pain relief and relief of numbness/tingling in the hand. He states they will also do right hand in a few weeks.     The following portions of the patient's history were reviewed and updated as appropriate: allergies, current medications, past family history, past medical history, past social history, past surgical history and problem list.    Review of Systems  Constitutional: negative  Eyes: negative  Ears, nose, mouth, throat, and face: negative  Respiratory: negative  Cardiovascular: negative  Gastrointestinal: negative  Genitourinary:negative  Integument/breast: negative  Hematologic/lymphatic: negative  Musculoskeletal:negative  Neurological: positive for hand numnbess tingling  Behavioral/Psych: negative  Endocrine: negative  Allergic/Immunologic: negative      Objective:     /78 (Patient Site: Left Arm, Patient Position: Sitting, Cuff Size: Adult Regular)  Pulse 76  Temp 97.9  F (36.6  C) (Oral)   Resp 16  Ht 5' 5\" (1.651 m)  Wt 155 lb 1 oz (70.3 kg)  BMI 25.8 kg/m2  General: Patient appears to be in no acute distress.  Eyes: Inferior palpebral conjunctiva clear and pink, no exudates or tearing. Sclera are white. Eyelids symmetrical, no erythema, flakiness, fasciculations, or ptosis. Pupils react equally to Light and accommodation. EOMS intact. No lid lag, no nystagmus, corneal reflex equal bilaterally, cornea and lens clear, red reflex present, optic disc cream colored with well defined borders. Retina pink, no hemorrhages or exudates.  Ears: No nodules, lesions, masses, discharge or tenderness in auricles or mastoid area. No cerumen in ear canals. Tympanic membranes pearly gray, normal bony landmarks and cone of light bilaterally, no bulges or " perforations.   Oropharynx: Buccal mucosa pink, moist, no lesions. Tongue with some white plaque,midline, spongy, pink. Uvula midline. Pharynx with no erythema, no exudates. Tonsils + 1.  Neck: Full range of motion, no pain with palpation of spine or paraspinal muscles.  Lungs: Unlabored. clear breath sounds heard throughout lung fields.   Heart: Regular rate and rhythm.  Abdomen: Soft rounded abdomen, laproscopic surgical scars. Bowel sounds heard in all four quadrants; liver, spleen, and kidney not palpable, no tenderness on palpation of abdomen, no CVA tenderness.    Musculoskeletal: muscles appear symmetric, muscle strength appropriate (Bilateral upper and lower extremities strength 5/5) and equal bilaterally full range of active and passive motion, spine and extremities in good alignment.  Neuro: Coordinated, smooth gait, balance, rapid alternating movements, sensory functioning, and cranial nerves II-XII grossly intact. DTR's+2 bilaterally brachioradialis, knee, ankle. Rhomberg s wnl.        Predictors of intubation difficulty:   Morbid obesity? no   Anatomically abnormal facies? no   Prominent incisors? no   Receding mandible? no   Short, thick neck? no   Neck range of motion: normal   Mallampati score: I (soft palate, uvula, fauces, and tonsillar pillars visible)   Dentition: No chipped, loose, or missing teeth.    Cardiographics  ECG: not indicated.  Echocardiogram: not done    Imaging  Chest x-ray: not done     Lab Review   Lab Results   Component Value Date     (L) 05/31/2017    K 4.2 05/31/2017    CL 97 (L) 05/31/2017    CO2 22 05/31/2017    BUN 10 05/31/2017    CREATININE 0.96 05/31/2017    CALCIUM 8.9 05/31/2017     Lab Results   Component Value Date    WBC 8.0 05/31/2017    WBC 9.6 11/02/2014    HGB 13.9 (L) 06/29/2017    HCT 43.6 05/31/2017    MCV 93 05/31/2017     05/31/2017        Marilee Griffin CNP    This note has been dictated using voice recognition software. Any grammatical or context  distortions are unintentional and inherent to the software

## 2021-06-11 NOTE — PROGRESS NOTES
Assessment & Plan:  1. Night sweats  Ongoing night sweats worsening over the past couple of weeks to a month.  Will check labs to rule out any more serious issues that could be contributing, check thyroid and vitamin levels.  I suspect that some of this is still related to his uncontrolled diabetes, patient does not believe this to be true.  He is still not followed up with endocrine as far as I can see and we discussed the importance of this appointment to get blood sugars under good control.  Patient also follow-up again with diabetic education as he missed his previous follow-up.  Follow for lab results and complete further studies as necessary.  - Morphology,Smear Review ([facility])  - Thyroid Cascade  - Vitamin B12  - Vitamin D, Total (25-Hydroxy)  - Manual Differential  - Peripheral Blood Smear, Path Review    2. Abdominal pain  Ongoing abdominal pain worsening over the past month.  Pain is like an abdominal discomfort generally in the center of the abdomen also causing some nausea.  Given the length of time of symptoms we will get a CT scan to rule out any severe abnormalities.  Labs checked today.  Check for H pylori and blood work.  Also discussed that gastroparesis could be a common cause related to his diabetes that could be causing symptoms.  If not finding anything initially on the lab work continue to work on getting diabetes under better control as this will improve GI function.  - Comprehensive Metabolic Panel  - CT Abdomen Pelvis With Oral With IV Contrast; Future  - H. pylori Antibody, IgG      There are no Patient Instructions on file for this visit.    Orders Placed This Encounter   Procedures     CT Abdomen Pelvis With Oral With IV Contrast     Standing Status:   Future     Standing Expiration Date:   5/31/2018     Order Specific Question:   Can the procedure be changed per Radiologist protocol?     Answer:   Yes     Order Specific Question:   If this is a diagnostic procedure, have the patient's age  and recent imaging history been considered?     Answer:   Yes     Morphology,Smear Review ([facility])     Thyroid Cascade     Vitamin B12     Vitamin D, Total (25-Hydroxy)     Comprehensive Metabolic Panel     H. pylori Antibody, IgG     Manual Differential     There are no discontinued medications.      Chief Complaint:   Chief Complaint   Patient presents with     Abdominal Pain     c/o stomach aches x 1 month      Night Sweats     c/o of night sweats getting worse        History of Present Illness:  Boris is a 36 y.o. male presenting to the clinic today with worsening night sweats and stomach pain in the last month.  Patient feels he is having less regular stools that usual, possibly constipated.  He has had both of these issues ongoing for some time but they have been worsening in the past month.  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  He does use marijuana on a regular basis.  This helps with his symptoms of stomach pain.  He has had decreased appetite due to stomach pain.  Pain is located generally in the mid abdomen.  He also has associated nausea but no vomiting.  He has been having 1-2 stools per week.  This is slightly less for him.  They are fairly firm when he goes.  Not painful to have a bowel movement.  No increased gas.  He has had a couple her upper endoscopies in the past and they saw ulcers in the past.  He takes omeprazole and this is controlling his GERD symptoms well.  His blood sugars have been running between 100-200.  He has been checking them 3-4 times a day.  He will occasionally have a high blood sugar around 300 but this is not usual for him.  Last A1c was done about a month ago and is 9.2.  He has had follow-up in February with diabetic education, but has not followed up with endocrine although we have placed several referrals.  Upon discussion he does seem motivated to get his diabetes under control, but does not complete necessary visits to do so.  His last  visit with diabetic education he did not show up.  I did discuss that some of the symptoms without further findings could be contributed to uncontrolled diabetes.  Patient does not believe that his sugars would be causing such symptoms. History of cholecystectomy.    Review of Systems:  All other systems are negative except as noted above.    PFSH:  Illicit drugs: no recent smoked amphetamines and frequent smoked marijuana   Sober from methamphetamines since easter.  No alcohol use.    Tobacco Use:  History   Smoking Status     Current Every Day Smoker   Smokeless Tobacco     Not on file       Vitals:  Vitals:    05/31/17 1510   BP: 108/66   Patient Site: Left Arm   Patient Position: Sitting   Cuff Size: Adult Large   Pulse: 78   Resp: 16   Weight: 157 lb 2 oz (71.3 kg)     Wt Readings from Last 3 Encounters:   05/31/17 157 lb 2 oz (71.3 kg)   04/27/17 153 lb 3 oz (69.5 kg)   04/19/17 146 lb 4 oz (66.3 kg)       Physical Exam:  Constitutional:  Reveals an alert, cooperative, 36 year old male in no acute distress.  Vitals:  Per nursing notes.  Eyes: PERRLA, funduscopic exam within normal limits.  HENT: TMs clear bilaterally,Oropharynx without erythema, posterior nasal drainage or thrush. Neck supple, no lymphadenopathy,  thyroid not palpable.  Cardiovascular:  Regular rate and rhythm without murmurs, rubs, or gallops. Carotids without bruits. Legs without edema.   Respiratory: Clear.  Respiratory effort normal.  GI:  Slight tenderness mid abdomen. BS active all 4 quadrants. non-distended.  Neither liver nor spleen palpable.  Skin:   Without rash, bruise, or palpable lesions.  Lymph: No lymphadenopathy.  Psychiatric:  Mood appropriate, memory intact.     Data Reviewed:  Additional History from Old Records or Another Person Summarized (2 total): None.     Decision to Obtain Extra information (1 total): None.     Radiology Tests Summarized and Ordered (XRAY/CT/MRI/DXA) (1 total): ct    Labs Reviewed and Ordered (1  total): labs as ordered    Medicine Tests Summarized and Ordered (EKG/ECHO/COLONOSCOPY/EGD) (1 total): None.    Independent Review of EKG or X-Ray (2 each): None.    The visit lasted a total of 25 minutes face to face with the patient. Over 50% of the time was spent counseling and educating the patient about plan of care.    Medications:  Current Outpatient Prescriptions   Medication Sig Dispense Refill     INSULIN ASPART (NOVOLOG SUBQ) Inject under the skin.       insulin glargine (LANTUS; BASAGLAR) 100 unit/mL (3 mL) pen Inject 30 Units under the skin at bedtime. 5 adj dose pen 0     lisinopril (PRINIVIL,ZESTRIL) 2.5 MG tablet Take 1 tablet (2.5 mg total) by mouth daily. 90 tablet 1     LORazepam (ATIVAN) 0.5 MG tablet TAKE 1 TABLET PO TID PRN FOR ANXIETY.  0     NOVOLOG FLEXPEN 100 unit/mL injection pen Inject 5 Units under the skin as needed. 5 Pre-filled Pen Syringe 2     omeprazole (PRILOSEC) 20 MG capsule Take 1 capsule (20 mg total) by mouth daily. 90 capsule 1     OMEPRAZOLE ORAL Take by mouth.       PARoxetine (PAXIL) 20 MG tablet Take 20 mg by mouth daily.  0     ranitidine (ZANTAC) 150 MG tablet Take 2 tablets (300 mg total) by mouth bedtime. 90 tablet 1     RANITIDINE HCL ORAL Take by mouth.       sertraline (ZOLOFT) 50 MG tablet TAKE 1 TABLET(50 MG) BY MOUTH DAILY 30 tablet 2     No current facility-administered medications for this visit.        Total Data Points: 2    YASHIRA Abreu, CNP    This note has been dictated using voice recognition software. Any grammatical or context distortions are unintentional and inherent to the software

## 2021-06-15 PROBLEM — M79.643 HAND PAIN: Status: ACTIVE | Noted: 2017-02-09

## 2021-06-15 PROBLEM — F32.A ANXIETY AND DEPRESSION: Status: ACTIVE | Noted: 2017-02-09

## 2021-06-15 PROBLEM — K21.9 GERD (GASTROESOPHAGEAL REFLUX DISEASE): Status: ACTIVE | Noted: 2017-02-09

## 2021-06-15 PROBLEM — F41.9 ANXIETY AND DEPRESSION: Status: ACTIVE | Noted: 2017-02-09

## 2021-06-24 NOTE — TELEPHONE ENCOUNTER
Please call patient to schedule fasting diabetes check.  Patient was last seen 6/29/2017.     Please update PCP if patient is seeing another provider

## 2021-08-08 ENCOUNTER — HEALTH MAINTENANCE LETTER (OUTPATIENT)
Age: 41
End: 2021-08-08

## 2021-10-03 ENCOUNTER — HEALTH MAINTENANCE LETTER (OUTPATIENT)
Age: 41
End: 2021-10-03

## 2022-03-20 ENCOUNTER — HEALTH MAINTENANCE LETTER (OUTPATIENT)
Age: 42
End: 2022-03-20

## 2022-09-10 ENCOUNTER — HEALTH MAINTENANCE LETTER (OUTPATIENT)
Age: 42
End: 2022-09-10

## 2022-10-04 PROBLEM — E10.8 TYPE 1 DIABETES MELLITUS WITH UNSPECIFIED COMPLICATIONS (H): Status: ACTIVE | Noted: 2017-02-09

## 2023-01-21 ENCOUNTER — HEALTH MAINTENANCE LETTER (OUTPATIENT)
Age: 43
End: 2023-01-21

## 2023-07-23 ENCOUNTER — HEALTH MAINTENANCE LETTER (OUTPATIENT)
Age: 43
End: 2023-07-23

## 2023-10-01 ENCOUNTER — HEALTH MAINTENANCE LETTER (OUTPATIENT)
Age: 43
End: 2023-10-01

## 2024-02-18 ENCOUNTER — HEALTH MAINTENANCE LETTER (OUTPATIENT)
Age: 44
End: 2024-02-18